# Patient Record
Sex: MALE | Race: BLACK OR AFRICAN AMERICAN | NOT HISPANIC OR LATINO
[De-identification: names, ages, dates, MRNs, and addresses within clinical notes are randomized per-mention and may not be internally consistent; named-entity substitution may affect disease eponyms.]

---

## 2018-09-20 PROBLEM — Z00.00 ENCOUNTER FOR PREVENTIVE HEALTH EXAMINATION: Status: ACTIVE | Noted: 2018-09-20

## 2018-11-27 ENCOUNTER — APPOINTMENT (OUTPATIENT)
Dept: SURGICAL ONCOLOGY | Facility: CLINIC | Age: 63
End: 2018-11-27
Payer: COMMERCIAL

## 2018-11-27 VITALS
WEIGHT: 235 LBS | OXYGEN SATURATION: 97 % | HEART RATE: 71 BPM | SYSTOLIC BLOOD PRESSURE: 142 MMHG | HEIGHT: 74 IN | DIASTOLIC BLOOD PRESSURE: 89 MMHG | BODY MASS INDEX: 30.16 KG/M2

## 2018-11-27 DIAGNOSIS — Z87.19 PERSONAL HISTORY OF OTHER DISEASES OF THE DIGESTIVE SYSTEM: ICD-10-CM

## 2018-11-27 DIAGNOSIS — Z78.9 OTHER SPECIFIED HEALTH STATUS: ICD-10-CM

## 2018-11-27 PROCEDURE — 99244 OFF/OP CNSLTJ NEW/EST MOD 40: CPT

## 2018-12-04 PROBLEM — Z87.19 HISTORY OF HIATAL HERNIA: Status: RESOLVED | Noted: 2018-11-27 | Resolved: 2018-12-04

## 2018-12-04 PROBLEM — Z78.9 NON-SMOKER: Status: ACTIVE | Noted: 2018-11-27

## 2018-12-04 PROBLEM — Z87.19 HISTORY OF DIVERTICULOSIS: Status: RESOLVED | Noted: 2018-11-27 | Resolved: 2018-12-04

## 2018-12-14 ENCOUNTER — TRANSCRIPTION ENCOUNTER (OUTPATIENT)
Age: 63
End: 2018-12-14

## 2018-12-14 ENCOUNTER — OUTPATIENT (OUTPATIENT)
Dept: OUTPATIENT SERVICES | Facility: HOSPITAL | Age: 63
LOS: 1 days | Discharge: HOME | End: 2018-12-14
Payer: COMMERCIAL

## 2018-12-14 VITALS
TEMPERATURE: 98 F | WEIGHT: 237 LBS | OXYGEN SATURATION: 100 % | RESPIRATION RATE: 16 BRPM | DIASTOLIC BLOOD PRESSURE: 86 MMHG | SYSTOLIC BLOOD PRESSURE: 151 MMHG | HEIGHT: 74 IN | HEART RATE: 79 BPM

## 2018-12-14 DIAGNOSIS — Z98.890 OTHER SPECIFIED POSTPROCEDURAL STATES: Chronic | ICD-10-CM

## 2018-12-14 DIAGNOSIS — K76.89 OTHER SPECIFIED DISEASES OF LIVER: ICD-10-CM

## 2018-12-14 DIAGNOSIS — Z29.9 ENCOUNTER FOR PROPHYLACTIC MEASURES, UNSPECIFIED: ICD-10-CM

## 2018-12-14 LAB
ABO RH CONFIRMATION: SIGNIFICANT CHANGE UP
ANION GAP SERPL CALC-SCNC: 6 MMOL/L — SIGNIFICANT CHANGE UP (ref 5–17)
APTT BLD: 36.6 SEC — HIGH (ref 27.5–36.3)
BASOPHILS # BLD AUTO: 0.04 K/UL — SIGNIFICANT CHANGE UP (ref 0–0.2)
BASOPHILS NFR BLD AUTO: 0.4 % — SIGNIFICANT CHANGE UP (ref 0–2)
BLD GP AB SCN SERPL QL: SIGNIFICANT CHANGE UP
BUN SERPL-MCNC: 17 MG/DL — SIGNIFICANT CHANGE UP (ref 7–23)
CALCIUM SERPL-MCNC: 10 MG/DL — SIGNIFICANT CHANGE UP (ref 8.5–10.1)
CHLORIDE SERPL-SCNC: 105 MMOL/L — SIGNIFICANT CHANGE UP (ref 96–108)
CO2 SERPL-SCNC: 28 MMOL/L — SIGNIFICANT CHANGE UP (ref 22–31)
CREAT SERPL-MCNC: 1.31 MG/DL — HIGH (ref 0.5–1.3)
EOSINOPHIL # BLD AUTO: 0.38 K/UL — SIGNIFICANT CHANGE UP (ref 0–0.5)
EOSINOPHIL NFR BLD AUTO: 4.1 % — SIGNIFICANT CHANGE UP (ref 0–6)
GLUCOSE SERPL-MCNC: 92 MG/DL — SIGNIFICANT CHANGE UP (ref 70–99)
HCT VFR BLD CALC: 49 % — SIGNIFICANT CHANGE UP (ref 39–50)
HGB BLD-MCNC: 16.6 G/DL — SIGNIFICANT CHANGE UP (ref 13–17)
IMM GRANULOCYTES NFR BLD AUTO: 0.3 % — SIGNIFICANT CHANGE UP (ref 0–1.5)
INR BLD: 1.01 RATIO — SIGNIFICANT CHANGE UP (ref 0.88–1.16)
LYMPHOCYTES # BLD AUTO: 2.17 K/UL — SIGNIFICANT CHANGE UP (ref 1–3.3)
LYMPHOCYTES # BLD AUTO: 23.5 % — SIGNIFICANT CHANGE UP (ref 13–44)
MCHC RBC-ENTMCNC: 32.8 PG — SIGNIFICANT CHANGE UP (ref 27–34)
MCHC RBC-ENTMCNC: 33.9 GM/DL — SIGNIFICANT CHANGE UP (ref 32–36)
MCV RBC AUTO: 96.8 FL — SIGNIFICANT CHANGE UP (ref 80–100)
MONOCYTES # BLD AUTO: 1.02 K/UL — HIGH (ref 0–0.9)
MONOCYTES NFR BLD AUTO: 11.1 % — SIGNIFICANT CHANGE UP (ref 2–14)
NEUTROPHILS # BLD AUTO: 5.59 K/UL — SIGNIFICANT CHANGE UP (ref 1.8–7.4)
NEUTROPHILS NFR BLD AUTO: 60.6 % — SIGNIFICANT CHANGE UP (ref 43–77)
NRBC # BLD: 0 /100 WBCS — SIGNIFICANT CHANGE UP (ref 0–0)
PLATELET # BLD AUTO: 323 K/UL — SIGNIFICANT CHANGE UP (ref 150–400)
POTASSIUM SERPL-MCNC: 4.1 MMOL/L — SIGNIFICANT CHANGE UP (ref 3.5–5.3)
POTASSIUM SERPL-SCNC: 4.1 MMOL/L — SIGNIFICANT CHANGE UP (ref 3.5–5.3)
PROTHROM AB SERPL-ACNC: 11.2 SEC — SIGNIFICANT CHANGE UP (ref 10–12.9)
RBC # BLD: 5.06 M/UL — SIGNIFICANT CHANGE UP (ref 4.2–5.8)
RBC # FLD: 11.9 % — SIGNIFICANT CHANGE UP (ref 10.3–14.5)
SODIUM SERPL-SCNC: 139 MMOL/L — SIGNIFICANT CHANGE UP (ref 135–145)
TYPE + AB SCN PNL BLD: SIGNIFICANT CHANGE UP
WBC # BLD: 9.23 K/UL — SIGNIFICANT CHANGE UP (ref 3.8–10.5)
WBC # FLD AUTO: 9.23 K/UL — SIGNIFICANT CHANGE UP (ref 3.8–10.5)

## 2018-12-14 PROCEDURE — 93010 ELECTROCARDIOGRAM REPORT: CPT

## 2018-12-14 NOTE — H&P PST ADULT - HISTORY OF PRESENT ILLNESS
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

## 2018-12-14 NOTE — H&P PST ADULT - ASSESSMENT
63 year old male presents to PST for robotic liver cyst fenestration supine     Plan:  1. PST instructions given ; NPO post midnight   2. Labs EKG done as per surgeon request   3. EZ wash instructions given   4. Medical Evaluation with Dr Darrow CAPRINI SCORE [CLOT]    AGE RELATED RISK FACTORS                                                       MOBILITY RELATED FACTORS  [ ] Age 41-60 years                                            (1 Point)                  [ ] Bed rest                                                        (1 Point)  [x ] Age: 61-74 years                                           (2 Points)                 [ ] Plaster cast                                                   (2 Points)  [ ] Age= 75 years                                              (3 Points)                 [ ] Bed bound for more than 72 hours                 (2 Points)    DISEASE RELATED RISK FACTORS                                               GENDER SPECIFIC FACTORS  [ ] Edema in the lower extremities                       (1 Point)                  [ ] Pregnancy                                                     (1 Point)  [ ] Varicose veins                                               (1 Point)                  [ ] Post-partum < 6 weeks                                   (1 Point)             [x ] BMI > 25 Kg/m2                                            (1 Point)                  [ ] Hormonal therapy  or oral contraception          (1 Point)                 [ ] Sepsis (in the previous month)                        (1 Point)                  [ ] History of pregnancy complications                 (1 point)  [ ] Pneumonia or serious lung disease                                               [ ] Unexplained or recurrent                     (1 Point)           (in the previous month)                               (1 Point)  [ ] Abnormal pulmonary function test                     (1 Point)                 SURGERY RELATED RISK FACTORS  [ ] Acute myocardial infarction                              (1 Point)                 [ ]  Section                                             (1 Point)  [ ] Congestive heart failure (in the previous month)  (1 Point)               [ ] Minor surgery                                                  (1 Point)   [ ] Inflammatory bowel disease                             (1 Point)                 [ ] Arthroscopic surgery                                        (2 Points)  [ ] Central venous access                                      (2 Points)                [x ] General surgery lasting more than 45 minutes   (2 Points)       [ ] Stroke (in the previous month)                          (5 Points)               [ ] Elective arthroplasty                                         (5 Points)            ( ) past and present malignancy                             ( 2 points)                                                                                                                                    HEMATOLOGY RELATED FACTORS                                                 TRAUMA RELATED RISK FACTORS  [ ] Prior episodes of VTE                                     (3 Points)                 [ ] Fracture of the hip, pelvis, or leg                       (5 Points)  [ ] Positive family history for VTE                         (3 Points)                 [ ] Acute spinal cord injury (in the previous month)  (5 Points)  [ ] Prothrombin 08131 A                                     (3 Points)                 [ ] Paralysis  (less than 1 month)                             (5 Points)  [ ] Factor V Leiden                                             (3 Points)                  [ ] Multiple Trauma within 1 month                        (5 Points)  [ ] Lupus anticoagulants                                     (3 Points)                                                           [ ] Anticardiolipin antibodies                               (3 Points)                                                       [ ] High homocysteine in the blood                      (3 Points)                                             [ ] Other congenital or acquired thrombophilia      (3 Points)                                                [ ] Heparin induced thrombocytopenia                  (3 Points)                                          Total Score [        5  ]

## 2018-12-17 PROBLEM — K57.90 DIVERTICULOSIS OF INTESTINE, PART UNSPECIFIED, WITHOUT PERFORATION OR ABSCESS WITHOUT BLEEDING: Chronic | Status: ACTIVE | Noted: 2018-12-14

## 2018-12-17 PROBLEM — G47.33 OBSTRUCTIVE SLEEP APNEA (ADULT) (PEDIATRIC): Chronic | Status: ACTIVE | Noted: 2018-12-14

## 2018-12-17 PROBLEM — K76.89 OTHER SPECIFIED DISEASES OF LIVER: Chronic | Status: ACTIVE | Noted: 2018-12-14

## 2018-12-17 PROBLEM — K44.9 DIAPHRAGMATIC HERNIA WITHOUT OBSTRUCTION OR GANGRENE: Chronic | Status: ACTIVE | Noted: 2018-12-14

## 2018-12-19 ENCOUNTER — INPATIENT (INPATIENT)
Facility: HOSPITAL | Age: 63
LOS: 0 days | Discharge: HOME | End: 2018-12-20
Attending: SURGERY | Admitting: SURGERY
Payer: COMMERCIAL

## 2018-12-19 ENCOUNTER — APPOINTMENT (OUTPATIENT)
Dept: SURGICAL ONCOLOGY | Facility: HOSPITAL | Age: 63
End: 2018-12-19

## 2018-12-19 ENCOUNTER — RESULT REVIEW (OUTPATIENT)
Age: 63
End: 2018-12-19

## 2018-12-19 VITALS
RESPIRATION RATE: 16 BRPM | OXYGEN SATURATION: 100 % | WEIGHT: 237 LBS | SYSTOLIC BLOOD PRESSURE: 147 MMHG | DIASTOLIC BLOOD PRESSURE: 90 MMHG | HEIGHT: 74 IN | TEMPERATURE: 98 F | HEART RATE: 90 BPM

## 2018-12-19 DIAGNOSIS — Z98.890 OTHER SPECIFIED POSTPROCEDURAL STATES: Chronic | ICD-10-CM

## 2018-12-19 PROCEDURE — 88307 TISSUE EXAM BY PATHOLOGIST: CPT | Mod: 26

## 2018-12-19 PROCEDURE — S2900 ROBOTIC SURGICAL SYSTEM: CPT | Mod: NC

## 2018-12-19 PROCEDURE — 47300 SURGERY FOR LIVER LESION: CPT | Mod: 82

## 2018-12-19 PROCEDURE — 47300 SURGERY FOR LIVER LESION: CPT

## 2018-12-19 RX ORDER — SODIUM CHLORIDE 9 MG/ML
1000 INJECTION INTRAMUSCULAR; INTRAVENOUS; SUBCUTANEOUS
Qty: 0 | Refills: 0 | Status: DISCONTINUED | OUTPATIENT
Start: 2018-12-19 | End: 2018-12-20

## 2018-12-19 RX ORDER — HEPARIN SODIUM 5000 [USP'U]/ML
5000 INJECTION INTRAVENOUS; SUBCUTANEOUS EVERY 12 HOURS
Qty: 0 | Refills: 0 | Status: DISCONTINUED | OUTPATIENT
Start: 2018-12-19 | End: 2018-12-20

## 2018-12-19 RX ORDER — SODIUM CHLORIDE 9 MG/ML
1000 INJECTION, SOLUTION INTRAVENOUS
Qty: 0 | Refills: 0 | Status: DISCONTINUED | OUTPATIENT
Start: 2018-12-19 | End: 2018-12-19

## 2018-12-19 RX ORDER — OXYCODONE HYDROCHLORIDE 5 MG/1
5 TABLET ORAL EVERY 6 HOURS
Qty: 0 | Refills: 0 | Status: DISCONTINUED | OUTPATIENT
Start: 2018-12-19 | End: 2018-12-19

## 2018-12-19 RX ORDER — ONDANSETRON 8 MG/1
4 TABLET, FILM COATED ORAL ONCE
Qty: 0 | Refills: 0 | Status: DISCONTINUED | OUTPATIENT
Start: 2018-12-19 | End: 2018-12-19

## 2018-12-19 RX ORDER — FENTANYL CITRATE 50 UG/ML
50 INJECTION INTRAVENOUS
Qty: 0 | Refills: 0 | Status: DISCONTINUED | OUTPATIENT
Start: 2018-12-19 | End: 2018-12-19

## 2018-12-19 RX ORDER — MORPHINE SULFATE 50 MG/1
4 CAPSULE, EXTENDED RELEASE ORAL EVERY 4 HOURS
Qty: 0 | Refills: 0 | Status: DISCONTINUED | OUTPATIENT
Start: 2018-12-19 | End: 2018-12-20

## 2018-12-19 RX ORDER — OXYCODONE HYDROCHLORIDE 5 MG/1
5 TABLET ORAL EVERY 4 HOURS
Qty: 0 | Refills: 0 | Status: DISCONTINUED | OUTPATIENT
Start: 2018-12-19 | End: 2018-12-20

## 2018-12-19 RX ORDER — INFLUENZA VIRUS VACCINE 15; 15; 15; 15 UG/.5ML; UG/.5ML; UG/.5ML; UG/.5ML
0.5 SUSPENSION INTRAMUSCULAR ONCE
Qty: 0 | Refills: 0 | Status: COMPLETED | OUTPATIENT
Start: 2018-12-19 | End: 2018-12-19

## 2018-12-19 RX ORDER — OXYCODONE HYDROCHLORIDE 5 MG/1
10 TABLET ORAL ONCE
Qty: 0 | Refills: 0 | Status: DISCONTINUED | OUTPATIENT
Start: 2018-12-19 | End: 2018-12-19

## 2018-12-19 RX ORDER — OXYCODONE HYDROCHLORIDE 5 MG/1
5 TABLET ORAL ONCE
Qty: 0 | Refills: 0 | Status: DISCONTINUED | OUTPATIENT
Start: 2018-12-19 | End: 2018-12-19

## 2018-12-19 RX ADMIN — OXYCODONE HYDROCHLORIDE 10 MILLIGRAM(S): 5 TABLET ORAL at 15:55

## 2018-12-19 RX ADMIN — OXYCODONE HYDROCHLORIDE 5 MILLIGRAM(S): 5 TABLET ORAL at 22:26

## 2018-12-19 RX ADMIN — OXYCODONE HYDROCHLORIDE 5 MILLIGRAM(S): 5 TABLET ORAL at 23:45

## 2018-12-19 RX ADMIN — SODIUM CHLORIDE 100 MILLILITER(S): 9 INJECTION INTRAMUSCULAR; INTRAVENOUS; SUBCUTANEOUS at 18:43

## 2018-12-19 NOTE — BRIEF OPERATIVE NOTE - PROCEDURE
<<-----Click on this checkbox to enter Procedure Fenestration of liver  12/19/2018  fenestration of liver cyst and cystectomy  Active  [email or web address]

## 2018-12-19 NOTE — ASU DISCHARGE PLAN (ADULT/PEDIATRIC). - MEDICATION SUMMARY - MEDICATIONS TO TAKE
I will START or STAY ON the medications listed below when I get home from the hospital:    oxycodone-acetaminophen 5 mg-325 mg oral tablet  -- 1 tab(s) by mouth every 4 hours, As Needed -for moderate pain MDD:6   -- Caution federal law prohibits the transfer of this drug to any person other  than the person for whom it was prescribed.  May cause drowsiness.  Alcohol may intensify this effect.  Use care when operating dangerous machinery.  This prescription cannot be refilled.  This product contains acetaminophen.  Do not use  with any other product containing acetaminophen to prevent possible liver damage.  Using more of this medication than prescribed may cause serious breathing problems.    -- Indication: For take as needed for pain

## 2018-12-20 ENCOUNTER — TRANSCRIPTION ENCOUNTER (OUTPATIENT)
Age: 63
End: 2018-12-20

## 2018-12-20 VITALS
TEMPERATURE: 99 F | SYSTOLIC BLOOD PRESSURE: 130 MMHG | OXYGEN SATURATION: 97 % | HEART RATE: 81 BPM | RESPIRATION RATE: 18 BRPM | DIASTOLIC BLOOD PRESSURE: 77 MMHG

## 2018-12-20 LAB
ANION GAP SERPL CALC-SCNC: 6 MMOL/L — SIGNIFICANT CHANGE UP (ref 5–17)
BUN SERPL-MCNC: 13 MG/DL — SIGNIFICANT CHANGE UP (ref 7–23)
CALCIUM SERPL-MCNC: 9.1 MG/DL — SIGNIFICANT CHANGE UP (ref 8.5–10.1)
CHLORIDE SERPL-SCNC: 107 MMOL/L — SIGNIFICANT CHANGE UP (ref 96–108)
CO2 SERPL-SCNC: 29 MMOL/L — SIGNIFICANT CHANGE UP (ref 22–31)
CREAT SERPL-MCNC: 1.1 MG/DL — SIGNIFICANT CHANGE UP (ref 0.5–1.3)
GLUCOSE SERPL-MCNC: 113 MG/DL — HIGH (ref 70–99)
HCT VFR BLD CALC: 40.3 % — SIGNIFICANT CHANGE UP (ref 39–50)
HGB BLD-MCNC: 13.5 G/DL — SIGNIFICANT CHANGE UP (ref 13–17)
MCHC RBC-ENTMCNC: 31.9 PG — SIGNIFICANT CHANGE UP (ref 27–34)
MCHC RBC-ENTMCNC: 33.5 GM/DL — SIGNIFICANT CHANGE UP (ref 32–36)
MCV RBC AUTO: 95.3 FL — SIGNIFICANT CHANGE UP (ref 80–100)
NRBC # BLD: 0 /100 WBCS — SIGNIFICANT CHANGE UP (ref 0–0)
PLATELET # BLD AUTO: 252 K/UL — SIGNIFICANT CHANGE UP (ref 150–400)
POTASSIUM SERPL-MCNC: 4.4 MMOL/L — SIGNIFICANT CHANGE UP (ref 3.5–5.3)
POTASSIUM SERPL-SCNC: 4.4 MMOL/L — SIGNIFICANT CHANGE UP (ref 3.5–5.3)
RBC # BLD: 4.23 M/UL — SIGNIFICANT CHANGE UP (ref 4.2–5.8)
RBC # FLD: 11.9 % — SIGNIFICANT CHANGE UP (ref 10.3–14.5)
SODIUM SERPL-SCNC: 142 MMOL/L — SIGNIFICANT CHANGE UP (ref 135–145)
WBC # BLD: 13.56 K/UL — HIGH (ref 3.8–10.5)
WBC # FLD AUTO: 13.56 K/UL — HIGH (ref 3.8–10.5)

## 2018-12-20 RX ADMIN — OXYCODONE HYDROCHLORIDE 5 MILLIGRAM(S): 5 TABLET ORAL at 07:31

## 2018-12-20 RX ADMIN — OXYCODONE HYDROCHLORIDE 5 MILLIGRAM(S): 5 TABLET ORAL at 14:57

## 2018-12-20 RX ADMIN — SODIUM CHLORIDE 100 MILLILITER(S): 9 INJECTION INTRAMUSCULAR; INTRAVENOUS; SUBCUTANEOUS at 04:26

## 2018-12-20 RX ADMIN — OXYCODONE HYDROCHLORIDE 5 MILLIGRAM(S): 5 TABLET ORAL at 08:30

## 2018-12-20 NOTE — DISCHARGE NOTE ADULT - PATIENT PORTAL LINK FT
You can access the [facility] Patient Portal, offered by [facility], by registering with the following website: [email or web address]

## 2018-12-20 NOTE — DISCHARGE NOTE ADULT - CARE PROVIDER_API CALL
Oscar Chahal (MD), Surgery  [address] [place] [address]  Phone: [phone]  Fax: [phone]

## 2018-12-20 NOTE — DISCHARGE NOTE ADULT - CARE PLAN
Principal Discharge DX:	Liver cyst  Goal:	pain control  Assessment and plan of treatment:	liver cyst, robotic removal

## 2018-12-20 NOTE — DISCHARGE NOTE ADULT - MEDICATION SUMMARY - MEDICATIONS TO TAKE
I will START or STAY ON the medications listed below when I get home from the hospital:    oxycodone-acetaminophen 5 mg-325 mg oral tablet  -- 1 tab(s) by mouth every 4 hours, As Needed -for moderate pain MDD:6   -- Caution federal law prohibits the transfer of this drug to any person other  than the person for whom it was prescribed.  May cause drowsiness.  Alcohol may intensify this effect.  Use care when operating dangerous machinery.  This prescription cannot be refilled.  This product contains acetaminophen.  Do not use  with any other product containing acetaminophen to prevent possible liver damage.  Using more of this medication than prescribed may cause serious breathing problems.    -- Indication: For LIVER CYST

## 2018-12-26 ENCOUNTER — APPOINTMENT (OUTPATIENT)
Dept: DERMATOLOGY | Facility: CLINIC | Age: 63
End: 2018-12-26

## 2018-12-31 ENCOUNTER — RESULT REVIEW (OUTPATIENT)
Age: 63
End: 2018-12-31

## 2019-01-03 ENCOUNTER — APPOINTMENT (OUTPATIENT)
Dept: SURGICAL ONCOLOGY | Facility: CLINIC | Age: 64
End: 2019-01-03
Payer: COMMERCIAL

## 2019-01-03 VITALS
BODY MASS INDEX: 29.52 KG/M2 | DIASTOLIC BLOOD PRESSURE: 79 MMHG | HEART RATE: 82 BPM | HEIGHT: 74 IN | RESPIRATION RATE: 15 BRPM | WEIGHT: 230 LBS | SYSTOLIC BLOOD PRESSURE: 152 MMHG

## 2019-01-03 PROCEDURE — 99024 POSTOP FOLLOW-UP VISIT: CPT

## 2019-01-04 DIAGNOSIS — K76.89 OTHER SPECIFIED DISEASES OF LIVER: ICD-10-CM

## 2019-01-04 DIAGNOSIS — Z28.21 IMMUNIZATION NOT CARRIED OUT BECAUSE OF PATIENT REFUSAL: ICD-10-CM

## 2019-01-04 LAB — SURGICAL PATHOLOGY FINAL REPORT - CH: SIGNIFICANT CHANGE UP

## 2019-01-08 NOTE — HISTORY OF PRESENT ILLNESS
[de-identified] : Patient is a 64 y/o male with known history of hepatic cyst who recently developed epigastric abdominal pain.  He denies associated nausea/emesis or fever.  He has had no weight loss.  Colonoscopy performed 10/30/2018 demonstrated descending colon diverticulosis.  Upper endoscopy on the same day showed a hiatal hernia and candida esophagitis.  Abdominal ultrasound demonstrated several hepatic cyst, the largest measuring 14.4 x 12.2 cm.  Subsequent liver MRI 11/06/2018 shows multiple simple cyst, largest measuring 13 cm in the right lobe.  The gallbladder and biliary tree were within normal limits, hepatic panel 09/2018 was within normal limits.\par Per patient, this recent imaging represents an interval enlargement of his known hepatic cysts.  He has no other complaints.\par \par 01/03/2018:  Patient is s/p robotic liver cyst fenestration 12/19/2018.  He is recovering well.  Pathology demonstrates simple cyst without evidence of malignancy.  His preoperative right sided abdominal pain has resolved.

## 2019-02-11 ENCOUNTER — OTHER (OUTPATIENT)
Age: 64
End: 2019-02-11

## 2019-02-12 ENCOUNTER — FORM ENCOUNTER (OUTPATIENT)
Age: 64
End: 2019-02-12

## 2019-02-13 ENCOUNTER — OUTPATIENT (OUTPATIENT)
Dept: OUTPATIENT SERVICES | Facility: HOSPITAL | Age: 64
LOS: 1 days | End: 2019-02-13
Payer: COMMERCIAL

## 2019-02-13 ENCOUNTER — APPOINTMENT (OUTPATIENT)
Age: 64
End: 2019-02-13
Payer: COMMERCIAL

## 2019-02-13 DIAGNOSIS — K76.89 OTHER SPECIFIED DISEASES OF LIVER: ICD-10-CM

## 2019-02-13 DIAGNOSIS — Z98.890 OTHER SPECIFIED POSTPROCEDURAL STATES: Chronic | ICD-10-CM

## 2019-02-13 DIAGNOSIS — Z00.8 ENCOUNTER FOR OTHER GENERAL EXAMINATION: ICD-10-CM

## 2019-02-13 PROCEDURE — 74177 CT ABD & PELVIS W/CONTRAST: CPT | Mod: 26

## 2019-02-13 PROCEDURE — 74177 CT ABD & PELVIS W/CONTRAST: CPT

## 2019-02-14 ENCOUNTER — APPOINTMENT (OUTPATIENT)
Dept: SURGICAL ONCOLOGY | Facility: CLINIC | Age: 64
End: 2019-02-14
Payer: COMMERCIAL

## 2019-02-14 VITALS
RESPIRATION RATE: 15 BRPM | WEIGHT: 240 LBS | OXYGEN SATURATION: 98 % | BODY MASS INDEX: 30.8 KG/M2 | HEART RATE: 88 BPM | SYSTOLIC BLOOD PRESSURE: 147 MMHG | DIASTOLIC BLOOD PRESSURE: 77 MMHG | HEIGHT: 74 IN

## 2019-02-14 DIAGNOSIS — K76.89 OTHER SPECIFIED DISEASES OF LIVER: ICD-10-CM

## 2019-02-14 PROCEDURE — 99024 POSTOP FOLLOW-UP VISIT: CPT

## 2019-02-18 PROBLEM — K76.89 LIVER CYST: Status: ACTIVE | Noted: 2018-12-04

## 2019-02-18 NOTE — ASSESSMENT
[FreeTextEntry1] : Suspect post-operative adhesions as cause of abdominal pain.\par Pain control, activity as tolerated.\par Follow up in 2 months.

## 2019-02-18 NOTE — HISTORY OF PRESENT ILLNESS
[de-identified] : Patient is a 64 y/o male with known history of hepatic cyst who recently developed epigastric abdominal pain.  He denies associated nausea/emesis or fever.  He has had no weight loss.  Colonoscopy performed 10/30/2018 demonstrated descending colon diverticulosis.  Upper endoscopy on the same day showed a hiatal hernia and candida esophagitis.  Abdominal ultrasound demonstrated several hepatic cyst, the largest measuring 14.4 x 12.2 cm.  Subsequent liver MRI 11/06/2018 shows multiple simple cyst, largest measuring 13 cm in the right lobe.  The gallbladder and biliary tree were within normal limits, hepatic panel 09/2018 was within normal limits.\par Per patient, this recent imaging represents an interval enlargement of his known hepatic cysts.  He has no other complaints.\par \par 01/03/2018:  Patient is s/p robotic liver cyst fenestration 12/19/2018.  He is recovering well.  Pathology demonstrates simple cyst without evidence of malignancy.  His preoperative right sided abdominal pain has resolved.\par \par 02/14/2019:  Patient returns to office with mild, intermittent right sided abdominal/flank discomfort.  He denies nausea/emesis or difficulty with po intake.  Repeat CT scan 02/13/2019 does not show reaccumulation of giant right hepatic cyst or any other concerning findings.

## 2019-04-16 ENCOUNTER — APPOINTMENT (OUTPATIENT)
Dept: SURGICAL ONCOLOGY | Facility: CLINIC | Age: 64
End: 2019-04-16

## 2019-04-22 ENCOUNTER — APPOINTMENT (OUTPATIENT)
Dept: DERMATOLOGY | Facility: CLINIC | Age: 64
End: 2019-04-22
Payer: COMMERCIAL

## 2019-04-22 VITALS — BODY MASS INDEX: 30.42 KG/M2 | WEIGHT: 237 LBS | HEIGHT: 74 IN

## 2019-04-22 DIAGNOSIS — L30.8 OTHER SPECIFIED DERMATITIS: ICD-10-CM

## 2019-04-22 DIAGNOSIS — L82.1 OTHER SEBORRHEIC KERATOSIS: ICD-10-CM

## 2019-04-22 DIAGNOSIS — Z91.89 OTHER SPECIFIED PERSONAL RISK FACTORS, NOT ELSEWHERE CLASSIFIED: ICD-10-CM

## 2019-04-22 DIAGNOSIS — L91.0 HYPERTROPHIC SCAR: ICD-10-CM

## 2019-04-22 DIAGNOSIS — L60.8 OTHER NAIL DISORDERS: ICD-10-CM

## 2019-04-22 PROCEDURE — 11901 INJECT SKIN LESIONS >7: CPT

## 2019-04-22 PROCEDURE — 99243 OFF/OP CNSLTJ NEW/EST LOW 30: CPT | Mod: 25

## 2019-04-22 RX ORDER — TRIAMCINOLONE ACETONIDE 1 MG/G
0.1 OINTMENT TOPICAL
Qty: 1 | Refills: 1 | Status: ACTIVE | COMMUNITY
Start: 2019-04-22 | End: 1900-01-01

## 2019-04-22 NOTE — CONSULT LETTER
[Dear  ___] : Dear  [unfilled], [Consult Letter:] : I had the pleasure of evaluating your patient, [unfilled]. [Consult Closing:] : Thank you very much for allowing me to participate in the care of this patient.  If you have any questions, please do not hesitate to contact me. [Sincerely,] : Sincerely, [FreeTextEntry2] : Umm Marquez DO [FreeTextEntry1] : He has an eczematous dermatitis on the left intragluteal cleft and legs; and a painful hypertrophic scar in the right axilla.\par \par Please see attached chart note for further details and treatment plan. [FreeTextEntry3] : Kurtis Broussard MD\par [address], [address]\par [place] [address]\par Tel ([phone])\par Fax ([phone])\par Private line ([phone])\par

## 2019-04-22 NOTE — HISTORY OF PRESENT ILLNESS
[de-identified] : First visit for a 63-year-old,  male, referred by Umm Marquez DO for evaluation of growths. Patient particularly concerned about a painful "keloid." present in the right armpit.  No previous treatment.\par Patient also complains of an itchy rash on both legs present for 6 months. No previous treatment. [FreeTextEntry1] : Evaluation of growths

## 2019-04-22 NOTE — ASSESSMENT
[FreeTextEntry1] : Eczematous dermatitis present on the right intergluteal cleft and legs\par Hypertrophic scar present in the right axilla.\par Probable small scars present on both forearms\par Benign seborrheic keratoses present on the back\par Melanonychia striata of present on several nails

## 2019-04-22 NOTE — PHYSICAL EXAM
[Alert] : alert [Oriented x 3] : ~L oriented x 3 [Well Nourished] : well nourished [FreeTextEntry3] : The following areas were examined and no significant abnormalities were seen except as noted below:\par \par Type V skin\par \par scalp, face, eyelids, nose, lips, ears, neck, chest, abdomen, back,groin, buttocks, right arm, left arm, right hand, left hand,\par right  leg, left leg, right foot, left foot\par \par Right lower axilla: 5 x 1 cm, firm, tender, hyperpigmented smooth, linear plaque\par Much thinner similar lesion present on the right superior shoulder (patient has a history of rotator cuff surgery treated in the past".\par Forearms: Few 4-6 mm, firm, hyperpigmented plaques\par Back: Moderate. Multiple small linear hyperpigmented, slightly verrucous plaques, one more prominent present on the right upper mid back\par Left intragluteal cleft: Moderate mildly lichenified hyperpigmented plaque.\par Upper lateral legs bilaterally: Ill-defined hyperpigmented scaly patches\par Light homogeneous hyperpigmentation present over the lateral half of the left fourth fingernail\par Left fourth lateral toe nail: 4 mm , wide, hyperpigmented band present with a lighter area medially\par More ill-defined, hyperpigmentation present over the right fourth toenail\par \par No suspicious lesions seen\par

## 2019-06-03 ENCOUNTER — APPOINTMENT (OUTPATIENT)
Dept: DERMATOLOGY | Facility: CLINIC | Age: 64
End: 2019-06-03

## 2020-01-24 ENCOUNTER — EMERGENCY (EMERGENCY)
Facility: HOSPITAL | Age: 65
LOS: 1 days | Discharge: HOME | End: 2020-01-24
Attending: EMERGENCY MEDICINE
Payer: COMMERCIAL

## 2020-01-24 VITALS
DIASTOLIC BLOOD PRESSURE: 65 MMHG | RESPIRATION RATE: 16 BRPM | HEART RATE: 87 BPM | TEMPERATURE: 98 F | SYSTOLIC BLOOD PRESSURE: 132 MMHG | OXYGEN SATURATION: 98 %

## 2020-01-24 VITALS
HEART RATE: 90 BPM | WEIGHT: 235.01 LBS | OXYGEN SATURATION: 99 % | DIASTOLIC BLOOD PRESSURE: 90 MMHG | RESPIRATION RATE: 20 BRPM | SYSTOLIC BLOOD PRESSURE: 158 MMHG | TEMPERATURE: 98 F | HEIGHT: 62 IN

## 2020-01-24 DIAGNOSIS — Z98.890 OTHER SPECIFIED POSTPROCEDURAL STATES: Chronic | ICD-10-CM

## 2020-01-24 LAB
ALBUMIN SERPL ELPH-MCNC: 4.5 G/DL — SIGNIFICANT CHANGE UP (ref 3.3–5.2)
ALP SERPL-CCNC: 75 U/L — SIGNIFICANT CHANGE UP (ref 40–120)
ALT FLD-CCNC: 34 U/L — SIGNIFICANT CHANGE UP
ANION GAP SERPL CALC-SCNC: 11 MMOL/L — SIGNIFICANT CHANGE UP (ref 5–17)
APPEARANCE UR: CLEAR — SIGNIFICANT CHANGE UP
APTT BLD: 45.2 SEC — HIGH (ref 27.5–36.3)
AST SERPL-CCNC: 30 U/L — SIGNIFICANT CHANGE UP
BACTERIA # UR AUTO: ABNORMAL
BILIRUB SERPL-MCNC: 0.5 MG/DL — SIGNIFICANT CHANGE UP (ref 0.4–2)
BILIRUB UR-MCNC: NEGATIVE — SIGNIFICANT CHANGE UP
BUN SERPL-MCNC: 15 MG/DL — SIGNIFICANT CHANGE UP (ref 8–20)
CALCIUM SERPL-MCNC: 9.7 MG/DL — SIGNIFICANT CHANGE UP (ref 8.6–10.2)
CHLORIDE SERPL-SCNC: 101 MMOL/L — SIGNIFICANT CHANGE UP (ref 98–107)
CO2 SERPL-SCNC: 28 MMOL/L — SIGNIFICANT CHANGE UP (ref 22–29)
COLOR SPEC: YELLOW — SIGNIFICANT CHANGE UP
CREAT SERPL-MCNC: 1.11 MG/DL — SIGNIFICANT CHANGE UP (ref 0.5–1.3)
DIFF PNL FLD: ABNORMAL
EPI CELLS # UR: SIGNIFICANT CHANGE UP
GLUCOSE SERPL-MCNC: 115 MG/DL — HIGH (ref 70–99)
GLUCOSE UR QL: NEGATIVE MG/DL — SIGNIFICANT CHANGE UP
HCT VFR BLD CALC: 48.5 % — SIGNIFICANT CHANGE UP (ref 39–50)
HGB BLD-MCNC: 15.6 G/DL — SIGNIFICANT CHANGE UP (ref 13–17)
INR BLD: 0.97 RATIO — SIGNIFICANT CHANGE UP (ref 0.88–1.16)
KETONES UR-MCNC: NEGATIVE — SIGNIFICANT CHANGE UP
LEUKOCYTE ESTERASE UR-ACNC: NEGATIVE — SIGNIFICANT CHANGE UP
LIDOCAIN IGE QN: 22 U/L — SIGNIFICANT CHANGE UP (ref 22–51)
MCHC RBC-ENTMCNC: 31.6 PG — SIGNIFICANT CHANGE UP (ref 27–34)
MCHC RBC-ENTMCNC: 32.2 GM/DL — SIGNIFICANT CHANGE UP (ref 32–36)
MCV RBC AUTO: 98.2 FL — SIGNIFICANT CHANGE UP (ref 80–100)
NITRITE UR-MCNC: NEGATIVE — SIGNIFICANT CHANGE UP
PH UR: 6 — SIGNIFICANT CHANGE UP (ref 5–8)
PLATELET # BLD AUTO: 321 K/UL — SIGNIFICANT CHANGE UP (ref 150–400)
POTASSIUM SERPL-MCNC: 4.2 MMOL/L — SIGNIFICANT CHANGE UP (ref 3.5–5.3)
POTASSIUM SERPL-SCNC: 4.2 MMOL/L — SIGNIFICANT CHANGE UP (ref 3.5–5.3)
PROT SERPL-MCNC: 7.1 G/DL — SIGNIFICANT CHANGE UP (ref 6.6–8.7)
PROT UR-MCNC: 15 MG/DL
PROTHROM AB SERPL-ACNC: 11.2 SEC — SIGNIFICANT CHANGE UP (ref 10–12.9)
RBC # BLD: 4.94 M/UL — SIGNIFICANT CHANGE UP (ref 4.2–5.8)
RBC # FLD: 11.7 % — SIGNIFICANT CHANGE UP (ref 10.3–14.5)
RBC CASTS # UR COMP ASSIST: ABNORMAL /HPF (ref 0–4)
SODIUM SERPL-SCNC: 140 MMOL/L — SIGNIFICANT CHANGE UP (ref 135–145)
SP GR SPEC: 1.01 — SIGNIFICANT CHANGE UP (ref 1.01–1.02)
TROPONIN T SERPL-MCNC: 0.03 NG/ML — SIGNIFICANT CHANGE UP (ref 0–0.06)
UROBILINOGEN FLD QL: 1 MG/DL
WBC # BLD: 7.94 K/UL — SIGNIFICANT CHANGE UP (ref 3.8–10.5)
WBC # FLD AUTO: 7.94 K/UL — SIGNIFICANT CHANGE UP (ref 3.8–10.5)
WBC UR QL: SIGNIFICANT CHANGE UP

## 2020-01-24 PROCEDURE — 82553 CREATINE MB FRACTION: CPT

## 2020-01-24 PROCEDURE — 71046 X-RAY EXAM CHEST 2 VIEWS: CPT | Mod: 26

## 2020-01-24 PROCEDURE — 80053 COMPREHEN METABOLIC PANEL: CPT

## 2020-01-24 PROCEDURE — 85610 PROTHROMBIN TIME: CPT

## 2020-01-24 PROCEDURE — 99285 EMERGENCY DEPT VISIT HI MDM: CPT

## 2020-01-24 PROCEDURE — 84484 ASSAY OF TROPONIN QUANT: CPT

## 2020-01-24 PROCEDURE — 82550 ASSAY OF CK (CPK): CPT

## 2020-01-24 PROCEDURE — 83690 ASSAY OF LIPASE: CPT

## 2020-01-24 PROCEDURE — 85027 COMPLETE CBC AUTOMATED: CPT

## 2020-01-24 PROCEDURE — 36415 COLL VENOUS BLD VENIPUNCTURE: CPT

## 2020-01-24 PROCEDURE — 74177 CT ABD & PELVIS W/CONTRAST: CPT | Mod: 26

## 2020-01-24 PROCEDURE — 99284 EMERGENCY DEPT VISIT MOD MDM: CPT | Mod: 25

## 2020-01-24 PROCEDURE — 81001 URINALYSIS AUTO W/SCOPE: CPT

## 2020-01-24 PROCEDURE — 85730 THROMBOPLASTIN TIME PARTIAL: CPT

## 2020-01-24 PROCEDURE — 93005 ELECTROCARDIOGRAM TRACING: CPT

## 2020-01-24 PROCEDURE — 74177 CT ABD & PELVIS W/CONTRAST: CPT

## 2020-01-24 PROCEDURE — 71046 X-RAY EXAM CHEST 2 VIEWS: CPT

## 2020-01-24 PROCEDURE — 93010 ELECTROCARDIOGRAM REPORT: CPT

## 2020-01-24 PROCEDURE — 96374 THER/PROPH/DIAG INJ IV PUSH: CPT | Mod: XU

## 2020-01-24 RX ORDER — KETOROLAC TROMETHAMINE 30 MG/ML
30 SYRINGE (ML) INJECTION ONCE
Refills: 0 | Status: DISCONTINUED | OUTPATIENT
Start: 2020-01-24 | End: 2020-01-24

## 2020-01-24 RX ORDER — GABAPENTIN 400 MG/1
1 CAPSULE ORAL
Qty: 0 | Refills: 0 | DISCHARGE

## 2020-01-24 RX ORDER — SODIUM CHLORIDE 9 MG/ML
1000 INJECTION INTRAMUSCULAR; INTRAVENOUS; SUBCUTANEOUS ONCE
Refills: 0 | Status: COMPLETED | OUTPATIENT
Start: 2020-01-24 | End: 2020-01-24

## 2020-01-24 RX ORDER — ROSUVASTATIN CALCIUM 5 MG/1
1 TABLET ORAL
Qty: 0 | Refills: 0 | DISCHARGE

## 2020-01-24 RX ADMIN — SODIUM CHLORIDE 1000 MILLILITER(S): 9 INJECTION INTRAMUSCULAR; INTRAVENOUS; SUBCUTANEOUS at 13:13

## 2020-01-24 RX ADMIN — Medication 30 MILLIGRAM(S): at 14:12

## 2020-01-24 RX ADMIN — Medication 30 MILLIGRAM(S): at 14:30

## 2020-01-24 RX ADMIN — SODIUM CHLORIDE 1000 MILLILITER(S): 9 INJECTION INTRAMUSCULAR; INTRAVENOUS; SUBCUTANEOUS at 14:11

## 2020-01-24 NOTE — ED PROVIDER NOTE - PROGRESS NOTE DETAILS
CT abdomen negative, pain most likely secondary to myalgias from statin. Patient edcuated to trial off crestor and monitor for improvement in symptoms and f.u with PCP Next week. encouraged to drink fluids, use ibuprofen as needed for pain

## 2020-01-24 NOTE — ED PROVIDER NOTE - PMH
Diverticulosis    Hiatal hernia    HLD (hyperlipidemia)    Liver cyst    Neuropathy    MADELAINE on CPAP

## 2020-01-24 NOTE — ED PROVIDER NOTE - OBJECTIVE STATEMENT
65 y/o M with PMH HLD, diverticulosis, hiatal hernia with c/o midepigastric pain radiating across midsection under the ribcage, around back and down lower back. States ongoing since Wednesday. Denies injury or trauma. Has not gone to gym in 2 weeks (not related to pain). States it is associated with HA ad lightheadedness. Worse with movement. Reports when he moves he feels a "jolt" of pain that takes his breath away yet denies SOB and ROMAN. Denies fever, chills, nausea, vomiting, diarrhea, change in appetite, change in bladder or bowel control, dysuria, hematuria, dark urine.  Has hx liver cyst which was drained in 2018. States started on crestor 2 months ago.

## 2020-01-24 NOTE — ED PROVIDER NOTE - NSFOLLOWUPINSTRUCTIONS_ED_ALL_ED_FT
Please follow up with your primary care doctor next week  Drink plenty of fluids  Stop taking crestor

## 2020-01-24 NOTE — ED PROVIDER NOTE - MDM ORDERS SUBMITTED SELECTION
----- Message from Dany López sent at 11/2/2017  8:25 AM CDT -----  Contact: self [phone]  Patient would like to scheduled for MRI/CAT Scan    Please advise    Imaging Studies/Labs/EKG

## 2020-01-24 NOTE — ED ADULT NURSE NOTE - NSIMPLEMENTINTERV_GEN_ALL_ED
Implemented All Universal Safety Interventions:  [place] to call system. Call bell, personal items and telephone within reach. Instruct patient to call for assistance. Room bathroom lighting operational. Non-slip footwear when patient is off stretcher. Physically safe environment: no spills, clutter or unnecessary equipment. Stretcher in lowest position, wheels locked, appropriate side rails in place.

## 2020-01-24 NOTE — ED PROVIDER NOTE - ATTENDING CONTRIBUTION TO CARE
pt comes in c/o abdominal pain wraps around back pain worse with sit ups and starching out however abdomen id soft nontender, no rebound   on cholesterol meds    labs    ct

## 2020-01-24 NOTE — ED PROVIDER NOTE - PHYSICAL EXAMINATION
no cervical, thoracis or lumbar spine tenderness, no CVA tenderness, no palpable masses on palpation of abdomen, patient appears to be in pain with movement

## 2020-01-24 NOTE — ED ADULT NURSE NOTE - OBJECTIVE STATEMENT
pt reports lower sternal chest pain radiating to the upper abdominal area and mid back. pt reports pain then radiates down the rest of his back as well. pt reports he has had this pain for 3 days and is worse with movement. pt has no other complaints, denies having any other symptoms associated with pain. a and o x3. sitting calm in bed. breathing even and unlabored. nsr on cm. will continue to monitor.

## 2020-01-24 NOTE — ED PROVIDER NOTE - CLINICAL SUMMARY MEDICAL DECISION MAKING FREE TEXT BOX
63 y/o M with epigastric pain radiating around abdomen to back  x 2 days, denies injury, newly started on Crestor which could be causing muscle aches/ rhabdo?, will check CPK, concern for cardiac cause although less likely, troponin and EKG pending, will also obtain CT abdomen to evaluate for intraabdominal pathology despite negative abdominal exam and no reports of n/V/D or pain

## 2020-01-24 NOTE — ED PROVIDER NOTE - PATIENT PORTAL LINK FT
You can access the FollowMyHealth Patient Portal offered by [facility] by registering at the following website: [email or web address]. By joining [employer]’s FollowMyHealth portal, you will also be able to view your health information using other applications (apps) compatible with our system.

## 2020-01-24 NOTE — ED ADULT NURSE NOTE - CHPI ED NUR SYMPTOMS NEG
no chills/no congestion/no vomiting/no shortness of breath/no diaphoresis/no fever/no dizziness/no back pain/no nausea/no syncope

## 2021-04-05 ENCOUNTER — TRANSCRIPTION ENCOUNTER (OUTPATIENT)
Age: 66
End: 2021-04-05

## 2022-03-11 NOTE — ED PROVIDER NOTE - SKIN NEGATIVE STATEMENT, MLM
no abrasions, no jaundice, no lesions, no pruritis, and no rashes.
4 = No assist / stand by assistance

## 2023-06-01 NOTE — ASU PREOP CHECKLIST - NS PREOP CHK CHLOROHEX WASH
Department of Anesthesiology  Preprocedure Note       Name:  Clifton Shone   Age:  48 y.o.  :  1972                                          MRN:  [id number]         Date:  2023      Surgeon: Shayla Irene):  Jim Mahoney MD    Procedure: Procedure(s):  EGD DIAGNOSTIC ONLY  COLONOSCOPY DIAGNOSTIC    Medications prior to admission:   Prior to Admission medications    Medication Sig Start Date End Date Taking? Authorizing Provider   omeprazole (PRILOSEC) 40 MG delayed release capsule Take 1 capsule by mouth daily 23   Jim Mahoney MD   sodium-potassium-mag sulfate (SUPREP) 17.5-3.13-1.6 GM/177ML SOLN solution As directed 23   Jim Mahoney MD   famotidine (PEPCID) 20 MG tablet Take 1 tablet by mouth 2 times daily 3/18/23   Donita Santamaria MD       Current medications:    Current Facility-Administered Medications   Medication Dose Route Frequency Provider Last Rate Last Admin    0.9 % sodium chloride infusion   IntraVENous Continuous Jim Mahoney MD 75 mL/hr at 23 1032 New Bag at 23 1032    sodium chloride flush 0.9 % injection 5-40 mL  5-40 mL IntraVENous 2 times per day Jim Mahoney MD        sodium chloride flush 0.9 % injection 5-40 mL  5-40 mL IntraVENous PRN Jim Mahoney MD        0.9 % sodium chloride infusion  25 mL IntraVENous PRN Jim Mahoney MD           Allergies:  No Known Allergies    Problem List:    Patient Active Problem List   Diagnosis Code    Low HDL (under 40) E78.6    History of melena Z87.19    History of rectal bleeding Z87.19    Gastroesophageal reflux disease K21.9       Past Medical History:        Diagnosis Date    Herpes zoster 08    Low back pain 07    Sinusitis 04    Strep throat 06       Past Surgical History:        Procedure Laterality Date    FORESKIN MANIPULATION  child    foreskin surgery 2 deg.  phimosis    TONSILLECTOMY         Social History:    Social History     Tobacco Use    Smoking status: Never    Smokeless at home:

## 2024-02-26 NOTE — ASU DISCHARGE PLAN (ADULT/PEDIATRIC). - MEDICATION SUMMARY - MEDICATIONS TO CHANGE
I will SWITCH the dose or number of times a day I take the medications listed below when I get home from the hospital:  None
218.2

## 2024-03-01 NOTE — DISCHARGE NOTE ADULT - IF YOU ARE A SMOKER, IT IS IMPORTANT FOR YOUR HEALTH TO STOP SMOKING. PLEASE BE AWARE THAT SECOND HAND SMOKE IS ALSO HARMFUL.
Called and spoke to patient and wife. Provided bridging instructions listed below. Would like lovenox script sent to [facility]. E-scribed lovenox to [facility]. Sent bridging instructions via [employer] to patient as well. Pt scheduled to RTC on 3/8.        Day Date Warfarin Dose Lovenox Dose   3 days before 3/1/2024 No warfarin 8 AM: No Lovenox  8 PM: No Lovenox    2 days before 3/2/2024 No warfarin 8 AM: No Lovenox  8 PM: No Lovenox    1 day before 3/3/2024 No Warfarin 8 AM: 100 mg injection  8 PM: NO LOVENOX   Day of procedure 3/4/2024 Take  8 mg of warfarin in the evening 8 AM: NO LOVENOX  8 PM: 100 mg injection   1 day after 3/5/2024 Take  6 mg of warfarin in the evening 8 AM: 100 mg injection  8 PM: 100 mg injection   2 days after 3/6/2024 Take 6 mg of warfarin in the evening 8 AM: 100 mg injection  8 PM: 100 mg injection   3 days after 3/7/2024 Take 6 mg of warfarin in the evening 8 AM: 100 mg injection  8 PM: 100 mg injection   4 days after 3/8/2024 Return to clinic 8 AM: 100 mg injection  8 PM: 100 mg injection         For Pharmacy Admin Tracking Only    Intervention Detail: Adherence Monitorin, Dose Adjustment: 1, reason: Therapy Optimization, and Refill(s) Provided  Total # of Interventions Recommended: 3  Total # of Interventions Accepted: 3  Time Spent (min): 30     Statement Selected

## 2024-06-12 NOTE — ED ADULT TRIAGE NOTE - ADDITIONAL SAFETY/BANDS...
Products Recommended: Elta MD UV Clear
Detail Level: Detailed
General Sunscreen Counseling: I recommended a broad spectrum sunscreen with a SPF of 30 or higher.  I explained that SPF 30 sunscreens block approximately 97 percent of the sun's harmful rays.  Sunscreens should be applied at least 15 minutes prior to expected sun exposure and then every 2 hours after that as long as sun exposure continues. If swimming or exercising sunscreen should be reapplied every 45 minutes to an hour after getting wet or sweating.  One ounce, or the equivalent of a shot glass full of sunscreen, is adequate to protect the skin not covered by a bathing suit. I also recommended a lip balm with a sunscreen as well. Sun protective clothing can be used in lieu of sunscreen but must be worn the entire time you are exposed to the sun's rays.
Additional Safety/Bands: